# Patient Record
Sex: MALE | Race: WHITE | NOT HISPANIC OR LATINO | Employment: FULL TIME | URBAN - METROPOLITAN AREA
[De-identification: names, ages, dates, MRNs, and addresses within clinical notes are randomized per-mention and may not be internally consistent; named-entity substitution may affect disease eponyms.]

---

## 2023-05-29 ENCOUNTER — HOSPITAL ENCOUNTER (EMERGENCY)
Facility: HOSPITAL | Age: 38
Discharge: HOME | End: 2023-05-29
Attending: STUDENT IN AN ORGANIZED HEALTH CARE EDUCATION/TRAINING PROGRAM | Admitting: STUDENT IN AN ORGANIZED HEALTH CARE EDUCATION/TRAINING PROGRAM
Payer: COMMERCIAL

## 2023-05-29 VITALS
HEIGHT: 71 IN | SYSTOLIC BLOOD PRESSURE: 177 MMHG | WEIGHT: 260 LBS | OXYGEN SATURATION: 99 % | DIASTOLIC BLOOD PRESSURE: 110 MMHG | TEMPERATURE: 98 F | BODY MASS INDEX: 36.4 KG/M2 | HEART RATE: 72 BPM | RESPIRATION RATE: 18 BRPM

## 2023-05-29 DIAGNOSIS — K08.89 PAIN, DENTAL: Primary | ICD-10-CM

## 2023-05-29 PROCEDURE — 99283 EMERGENCY DEPT VISIT LOW MDM: CPT

## 2023-05-29 PROCEDURE — 96372 THER/PROPH/DIAG INJ SC/IM: CPT

## 2023-05-29 PROCEDURE — 25010000002 KETOROLAC TROMETHAMINE PER 15 MG: Performed by: STUDENT IN AN ORGANIZED HEALTH CARE EDUCATION/TRAINING PROGRAM

## 2023-05-29 RX ORDER — KETOROLAC TROMETHAMINE 30 MG/ML
30 INJECTION, SOLUTION INTRAMUSCULAR; INTRAVENOUS ONCE
Status: COMPLETED | OUTPATIENT
Start: 2023-05-29 | End: 2023-05-29

## 2023-05-29 RX ORDER — OXYCODONE HYDROCHLORIDE 5 MG/1
10 TABLET ORAL ONCE
Status: COMPLETED | OUTPATIENT
Start: 2023-05-29 | End: 2023-05-29

## 2023-05-29 RX ORDER — OXYCODONE HYDROCHLORIDE 5 MG/1
10 TABLET ORAL EVERY 8 HOURS PRN
Qty: 6 TABLET | Refills: 0 | Status: SHIPPED | OUTPATIENT
Start: 2023-05-29 | End: 2023-05-31

## 2023-05-29 RX ORDER — IBUPROFEN 800 MG/1
800 TABLET ORAL EVERY 6 HOURS PRN
Qty: 20 TABLET | Refills: 0 | Status: SHIPPED | OUTPATIENT
Start: 2023-05-29 | End: 2023-06-03

## 2023-05-29 RX ORDER — AMOXICILLIN AND CLAVULANATE POTASSIUM 875; 125 MG/1; MG/1
1 TABLET, FILM COATED ORAL 2 TIMES DAILY
Qty: 14 TABLET | Refills: 0 | Status: SHIPPED | OUTPATIENT
Start: 2023-05-29 | End: 2023-06-05

## 2023-05-29 RX ADMIN — OXYCODONE 10 MG: 5 TABLET ORAL at 03:17

## 2023-05-29 RX ADMIN — KETOROLAC TROMETHAMINE 30 MG: 30 INJECTION, SOLUTION INTRAMUSCULAR at 03:18

## 2023-05-29 NOTE — Clinical Note
[facility] FS[facility] [place] [id number] [place] [address] [place] [address]  Phone: [phone]    Ion Richardson was seen and treated in our emergency department on 5/29/2023.  He may return to work on 05/29/2023.         Thank you for choosing [facility].    Stacey Larson,

## 2023-05-29 NOTE — FSED PROVIDER NOTE
Subjective   History of Present Illness  Patient is a 38-year-old male presents emergency department secondary to dental pain.  Patient states he has ongoing issues with dental pain, he has a cracked right upper premolar.  Patient states he was provided antibiotics a few weeks ago, but did not finish the course of antibiotics.  About 1 hour prior to arrival patient was awoken with dental pain in that same area.  He has not noticed any facial swelling, drainage from the tooth.  Denies any difficulty breathing, difficulty swallowing.  No injury or trauma, no recent dental procedures.        Review of Systems   Constitutional: Negative for activity change and fever.   HENT: Positive for dental problem. Negative for congestion, rhinorrhea and sore throat.    Respiratory: Negative for cough and shortness of breath.    Cardiovascular: Negative for chest pain.   Gastrointestinal: Negative for abdominal pain, nausea and vomiting.   Genitourinary: Negative for dysuria.   Musculoskeletal: Negative for back pain and myalgias.   Skin: Negative for rash.   Neurological: Negative for dizziness, light-headedness and headaches.   Psychiatric/Behavioral: Negative for confusion.       History reviewed. No pertinent past medical history.    No Known Allergies    History reviewed. No pertinent surgical history.    History reviewed. No pertinent family history.    Social History     Socioeconomic History   • Marital status: Single           Objective   Physical Exam  Vitals and nursing note reviewed.   Constitutional:       General: He is not in acute distress.     Appearance: Normal appearance. He is not ill-appearing.   HENT:      Head: Normocephalic and atraumatic.      Nose: Nose normal.      Mouth/Throat:      Mouth: Mucous membranes are moist.      Dentition: Dental tenderness and dental caries present. No gingival swelling or dental abscesses.        Comments: No facial swelling  Eyes:      Conjunctiva/sclera: Conjunctivae  normal.   Cardiovascular:      Rate and Rhythm: Normal rate and regular rhythm.   Pulmonary:      Effort: Pulmonary effort is normal.   Abdominal:      General: Abdomen is flat.      Palpations: Abdomen is soft.   Musculoskeletal:         General: Normal range of motion.      Cervical back: Normal range of motion and neck supple.   Skin:     General: Skin is warm and dry.   Neurological:      General: No focal deficit present.      Mental Status: He is alert and oriented to person, place, and time.         Dental Procedure    Date/Time: 5/29/2023 3:41 AM  Performed by: Stacey Larson DO  Authorized by: Stacey Larson DO     Consent:     Consent obtained:  Verbal    Consent given by:  Patient    Risks, benefits, and alternatives were discussed: yes      Risks discussed:  Allergic reaction, hematoma, intravascular injection, infection, nerve damage, pain, unsuccessful block and swelling    Alternatives discussed:  No treatment, delayed treatment, alternative treatment and referral  Universal protocol:     Patient identity confirmed:  Verbally with patient  Indications:     Indications: dental pain    Location:     Block type:  Posterior superior alveolar    Laterality:  Right  Procedure details:     Syringe type:  Controlled syringe    Needle gauge:  27 G    Anesthetic injected:  Bupivacaine 0.5% w/o epi    Injection procedure:  Anatomic landmarks identified, introduced needle, negative aspiration for blood, anatomic landmarks palpated and incremental injection  Post-procedure details:     Outcome:  Anesthesia achieved    Procedure completion:  Tolerated               ED Course                                           Medical Decision Making  Patient is a 38-year-old male presents emergency department secondary to dental pain.  Patient was recent placed on antibiotics for dental infection but not complete his course of antibiotics.  On arrival he is afebrile, hypertensive but hemodynamically stable.  Physical  examination does have tenderness to the right upper premolar, there is no surrounding edema, abscess, drainage.  No facial swelling, no tongue elevation or protrusion, no submandibular tenderness.  Differential diagnosis includes dental abscess, dental carry.  Patient will be placed on antibiotics.  He was provided a dental block in the emergency department with good pain control.  Patient to be discharged home    Pain, dental: complicated acute illness or injury  Risk  Prescription drug management.          Final diagnoses:   Pain, dental       ED Disposition  ED Disposition     ED Disposition   Discharge    Condition   Stable    Comment   --             [place] [address] [place] [id number] [phone]    As needed, If symptoms worsen    [facility]  [address]  [place] [id number] [phone]  Schedule an appointment as soon as possible for a visit in 1 week           Medication List      No changes were made to your prescriptions during this visit.